# Patient Record
Sex: FEMALE | Race: WHITE | NOT HISPANIC OR LATINO | Employment: UNEMPLOYED | URBAN - METROPOLITAN AREA
[De-identification: names, ages, dates, MRNs, and addresses within clinical notes are randomized per-mention and may not be internally consistent; named-entity substitution may affect disease eponyms.]

---

## 2017-01-30 ENCOUNTER — TELEPHONE (OUTPATIENT)
Dept: PAIN MEDICINE | Facility: CLINIC | Age: 52
End: 2017-01-30

## 2017-01-30 RX ORDER — VITAMIN E 268 MG
400 CAPSULE ORAL DAILY
COMMUNITY

## 2017-01-30 RX ORDER — ESTRADIOL 1 MG/1
1 TABLET ORAL NIGHTLY
COMMUNITY

## 2017-01-30 RX ORDER — MONTELUKAST SODIUM 10 MG/1
10 TABLET ORAL DAILY
COMMUNITY

## 2017-01-30 RX ORDER — LEVOCETIRIZINE DIHYDROCHLORIDE 5 MG/1
5 TABLET, FILM COATED ORAL EVERY EVENING
COMMUNITY

## 2017-01-30 RX ORDER — ALPRAZOLAM 0.25 MG/1
0.5 TABLET ORAL 2 TIMES DAILY PRN
COMMUNITY
End: 2017-03-02 | Stop reason: SDUPTHER

## 2017-03-02 ENCOUNTER — OFFICE VISIT (OUTPATIENT)
Dept: PAIN MEDICINE | Facility: CLINIC | Age: 52
End: 2017-03-02

## 2017-03-02 VITALS
WEIGHT: 234.6 LBS | HEART RATE: 78 BPM | DIASTOLIC BLOOD PRESSURE: 86 MMHG | HEIGHT: 68 IN | RESPIRATION RATE: 18 BRPM | BODY MASS INDEX: 35.55 KG/M2 | OXYGEN SATURATION: 97 % | SYSTOLIC BLOOD PRESSURE: 140 MMHG | TEMPERATURE: 97.2 F

## 2017-03-02 DIAGNOSIS — H81.11 BPV (BENIGN POSITIONAL VERTIGO), RIGHT: ICD-10-CM

## 2017-03-02 DIAGNOSIS — G47.30 OBESITY WITH SLEEP APNEA: ICD-10-CM

## 2017-03-02 DIAGNOSIS — M48.061 LUMBAR FORAMINAL STENOSIS: ICD-10-CM

## 2017-03-02 DIAGNOSIS — E66.8 MODERATE OBESITY: Primary | ICD-10-CM

## 2017-03-02 DIAGNOSIS — M51.17 INTERVERTEBRAL DISC DISORDER WITH RADICULOPATHY OF LUMBOSACRAL REGION: ICD-10-CM

## 2017-03-02 DIAGNOSIS — R53.81 PHYSICAL DECONDITIONING: ICD-10-CM

## 2017-03-02 DIAGNOSIS — E66.9 OBESITY WITH SLEEP APNEA: ICD-10-CM

## 2017-03-02 DIAGNOSIS — E66.8 MODERATE OBESITY: ICD-10-CM

## 2017-03-02 DIAGNOSIS — F41.1 GENERALIZED ANXIETY DISORDER: ICD-10-CM

## 2017-03-02 PROBLEM — H81.10 BPV (BENIGN POSITIONAL VERTIGO): Status: ACTIVE | Noted: 2017-03-02

## 2017-03-02 PROBLEM — M51.27 DISPLACEMENT OF INTERVERTEBRAL DISC OF LUMBOSACRAL REGION: Status: ACTIVE | Noted: 2017-03-02

## 2017-03-02 PROBLEM — M47.816 SPONDYLOSIS OF LUMBAR REGION WITHOUT MYELOPATHY OR RADICULOPATHY: Status: ACTIVE | Noted: 2017-03-02

## 2017-03-02 PROBLEM — M81.0 OSTEOPOROSIS: Status: ACTIVE | Noted: 2017-03-02

## 2017-03-02 PROCEDURE — 99204 OFFICE O/P NEW MOD 45 MIN: CPT | Performed by: ANESTHESIOLOGY

## 2017-03-02 RX ORDER — ALPRAZOLAM 0.5 MG/1
TABLET ORAL
COMMUNITY
Start: 2017-02-21

## 2017-03-02 RX ORDER — MECLIZINE HYDROCHLORIDE 25 MG/1
TABLET ORAL
COMMUNITY
Start: 2017-01-26

## 2017-03-02 NOTE — PROGRESS NOTES
"Chief Complaint: \"Pain in my lower back and left leg to my foot.\"     History of Present Illness:   Patient: Ms. Barby Krause, 51 y.o. female   Referring physician: Dr. Josh Armas     Reason for referral: Consultation for intractable chronic lower back and left lower extremity pain.   Pain history: She reports a 10 year history of lower back pain, which began without incident. Pain has progressed in intensity over the past year, particularly since November, she started experiencing severe left lower extremity pain.   Pain description: Constant pain with intermittent exacerbation, described as aching sensation.   Radiation of pain: The lower back pain radiates into the posterior aspect of the left side, the left calf, and into the plantar aspect of her left foot into her toes.    Pain intensity today: 8/10  Average pain intensity last week: 8/10  Pain intensity ranges from: 5/10 to 10/10  Aggravating factors: Pain increases with twisting, bending, sitting longer than 60 minutes, standing for 15 minutes.  Alleviating factors: Pain decreases with analgesics, changing positions, and walking.   Associated symptoms:   Patient reports denies numbness or weakness in the left lower extremity, except for numbness in the toes of her left foot.   Patient reports any new bladder or bowel problems.   Patient reports difficulties with her balance due to benign positional vertigo. Patient denies recent falls.  Patient reports frequent cervicogenic headaches lasting 2 hours.     Review of previous therapies and additional medical records:  Barby Krause has already failed the following measures, including:   Conservative measures: Oral analgesics, physical therapy, ice, heat, chiropractic therapy and TENs.    Interventional measures: None    Surgical measures: No previous lumbar surgery.   Barby Krause did not undergo surgical or neurosurgical  Consultation for this condition. Patient is status post three " cervical spine surgeries, last one 2010 with Dr. Coyle.  Barby Krause presents with significant comorbidities including anxiety, engaged in treatment, obesity, arrhythmia IBS, GERD, engaged in treatment.  In terms of current analgesics, Barby Krause takes: Advil  I have reviewed [facility] Report #[id number] consistent to medication reconciliation.     Review of diagnostic Studies:   MRI, lumbar, without, 11/09/2016: Conus medullaris is unremarkable.  L1-L2, L2/L3: No significant disc abnormalities.  No canal or foraminal stenosis.  L3-L4: Mild annular bulging.  No canal or neuroforaminal stenosis  L4-L5: Advanced disc space narrowing. Mild diffuse annular bulge. Minor facet arthropathy. No central canal narrowing mild neural foraminal narrowing is seen bilaterally.  L5-S1: There is mild to moderate disc space narrowing. A small broad-based disc bulges seen in the left foraminal region. Mild facet arthropathy. Mild to moderate right and moderate left neuroforaminal narrowing.  X-rays of the lumbar spine August 25, 2016.  Revealed degenerative disc disease at L4-L5 and L5-S1.  Lumbar facet arthropathy.    Review of Systems   Constitutional: Positive for fatigue.   HENT: Positive for postnasal drip.    Cardiovascular: Positive for palpitations.   Musculoskeletal: Positive for back pain.   Allergic/Immunologic: Positive for environmental allergies and food allergies.   Neurological: Positive for dizziness, numbness and headaches.   Psychiatric/Behavioral: Positive for sleep disturbance. The patient is nervous/anxious.    All other systems reviewed and are negative.     Patient Active Problem List   Diagnosis   • Lumbar foraminal stenosis   • Moderate obesity   • Obesity with sleep apnea   • Generalized anxiety disorder   • Physical deconditioning   • BPV (benign positional vertigo)   • Intervertebral disc disorder with radiculopathy of lumbosacral region       Past Medical History   Diagnosis Date   • Anxiety     • Chronic pain disorder    • Extremity pain    • GERD (gastroesophageal reflux disease)    • Headache    • IBS (irritable bowel syndrome)    • Low back pain          Past Surgical History   Procedure Laterality Date   • Appendectomy     • Cholecystectomy     • Hysterectomy     • Tonsillectomy     •  section     • Knee arthroscopy w/ meniscal repair     • Neck surgery       3 different surgies on neck   • Foot surgery       3 surgeries on foot   • Shoulder surgery     • Eye surgery           Family History   Problem Relation Age of Onset   • Diabetes Mother    • Heart disease Mother    • Kidney disease Mother    • Heart disease Father          Social History     Social History   • Marital status:      Spouse name: N/A   • Number of children: N/A   • Years of education: N/A     Social History Main Topics   • Smoking status: Former Smoker   • Smokeless tobacco: Never Used   • Alcohol use No   • Drug use: No   • Sexual activity: Not Asked     Other Topics Concern   • None     Social History Narrative        Current Outpatient Prescriptions:   •  ALPRAZolam (XANAX) 0.5 MG tablet, , Disp: , Rfl:   •  Azelastine-Fluticasone (DYMISTA NA), into each nostril As Needed., Disp: , Rfl:   •  Cholecalciferol (VITAMIN D3) 5000 UNITS capsule capsule, Take 5,000 Units by mouth Daily., Disp: , Rfl:   •  estradiol (ESTRACE) 1 MG tablet, Take 1 mg by mouth Every Night., Disp: , Rfl:   •  levocetirizine (XYZAL) 5 MG tablet, Take 5 mg by mouth Every Evening., Disp: , Rfl:   •  meclizine (ANTIVERT) 25 MG tablet, , Disp: , Rfl:   •  montelukast (SINGULAIR) 10 MG tablet, Take 10 mg by mouth Daily., Disp: , Rfl:   •  progesterone (PROMETRIUM) 100 MG capsule, Take 100 mg by mouth Every Night., Disp: , Rfl:   •  vitamin E 400 UNIT capsule, Take 400 Units by mouth Daily., Disp: , Rfl:       Allergies   Allergen Reactions   • Clindamycin/Lincomycin    • Codeine    • Codone [Hydrocodone]    • Latex    • Penicillins    • Sulfa  "Antibiotics       Visit Vitals   • /86 (BP Location: Left arm, Patient Position: Sitting)   • Pulse 78   • Temp 97.2 °F (36.2 °C) (Temporal Artery )   • Resp 18   • Ht 68\" (172.7 cm)   • Wt 234 lb 9.6 oz (106 kg)   • SpO2 97%   • BMI 35.67 kg/m2      Physical Exam:  Constitutional: Patient is oriented to person, place, and time. Vital signs are normal. Patient appears well-developed and well-nourished.   HENT: Head: Normocephalic and atraumatic. Eyes: Conjunctivae and lids are normal. Pupils: Equal, round, reactive to light.   Neck: Trachea normal. Neck supple. No JVD present.   Pulmonary Respiratory effort: No increased work of breathing or signs of respiratory distress. Auscultation of lungs: Clear to auscultation.   Cardiovascular Auscultation of heart: Normal rate and rhythm, normal S1 and S2, no murmurs.   Peripheral vascular exam: Normal. No edema.   Abdomen: The abdomen was soft and nontender. Bowel sounds were normal.   Musculoskeletal   Gait and station: Gait evaluation demonstrated a normal gait.   Lumbar spine: The range of motion of the lumbar spine is almost full. Forward flexion increased and reproduced back pain. Lumbar facet joint loading maneuvers are negative.   Paul and Gaenslen's tests are negative   Piriformis maneuvers are negative   Palpation of the bilateral ischial tuberosities, unrevealing   Palpation of the bilateral greater trochanter, unrevealing   Examination of the Iliotibial band: unrevealing   Hip joints: The range of motion of the hip joints is full and without pain   Neurological: Patient is alert and oriented to person, place, and time. Speech: speech is normal. Cortical function: Normal mental status.   Cranial nerves: Cranial nerves 2-12 intact.   Reflex Scores:  Right patellar: 2+  Left patellar: 2+  Right achilles: 2+  Left achilles: 1+  Motor strength: 5/5, except left plantar flexion 4+/5   Motor Tone: normal tone.   Involuntary movements: none. "   Superficial/Primitive Reflexes: primitive reflexes were absent.   Right Amado: absent  Left Amado: absent  Right ankle clonus: absent  Left ankle clonus: absent   No nuchal rigidity. Negative Kernig and Brudzinski.  Spurling sign is negative. Lhermitte sign is negative. Negative long tract signs. Straight leg raising test is negative. Femoral stretch sign is negative.   Sensation: No sensory loss. Sensory exam: intact to light touch, intact pain and temperature sensation, intact vibration sensation and normal proprioception.   Coordination: Normal finger to nose and heel to shin. Normal balance and negative. Romberg's sign negative.   Skin and subcutaneous tissue: Skin is warm and intact. No rash noted. No cyanosis.   Psychiatric:   Judgment and insight: Normal.   Orientation to person, place and time: Normal.   Recent and remote memory: Intact.   Mood and affect: Normal.     ASSESSMENT:   1. Intervertebral disc disorder with radiculopathy of lumbosacral region    2. Lumbar foraminal stenosis    3. Moderate obesity    4. Obesity with sleep apnea    5. BPV (benign positional vertigo), right    6. Generalized anxiety disorder    7. Physical deconditioning      PLAN/MEDICAL DECISION MAKING: I had a lengthy conversation with Ms. Barby Krause regarding her chronic pain condition and potential therapeutic options including risks, benefits, alternative therapies, to name a few. Patient has failed to obtain pain relief with conservative measures, as referenced above. I have reviewed all available patient's medical records as well as previous therapies as referenced above.  Therefore, I have proposed the following plan:  1.  Diagnostic studies: EMG/NCV of the bilateral lower extremities   2.  Long-term rehabilitation efforts:  A. The patient does not have a history of falls. I did complete a risk assessment for falls. Patient has risks factors.  Fall precautions: Patient has been instructed regarding universal  fall precautions, such as;   · Using gait aids a cane at the appropriate height for ambulation   · Removing all area rugs and coffee tables to create a safe environment at home  · Ensure clean, dry floors  · Wearing supportive footwear and properly fitting clothing  · Ensure bed/chair is appropriate height and patient's feet can touch the floor  · Using a shower transfer bench  · Using walk-in shower and having shower safety bars installed  · Ensure proper lighting, minimize glare  · Have nightlights operational and in use  · Participation in an exercise program for gait training, balance training and strength  · Ensure proper compliance and organization of medications to avoid errors   · Avoid use of over the counter sedatives and alcohol consumption  · Ensure easy access to call bell, glasses, TV control, telephone  · Ensure glasses/hearing aids are in use or close by (on top of night table)  B.  Patient will start a comprehensive physical therapy program for water therapy, core strengthening, gait and balance training, neurodynamics, myofascial release, cupping and dry needling once pain is under control  C.  Start an exercise program such as water therapy  D.  Referral to [facility]  3.   Pharmacological measures: Patient has declined pharmacological trials  4.   Interventional pain management measures: Patient will be scheduled for diagnostic and therapeutic left L5-S1 and left S1 transforaminal epidural steroid injection. We may repeat another epidural depending on patient's outcome.  Otherwise, I will refer the patient for neurosurgical consultation  5.  The patient has been instructed to contact my office with any questions or difficulties. The patient understands the plan and agrees to proceed accordingly.       Patient Care Team:  Matheus Armas MD as PCP - General (Internal Medicine)  No Known Provider as PCP - Family Medicine  Tye Thurston MD as Consulting  Physician (Pain Medicine)     No orders of the defined types were placed in this encounter.        No future appointments.      Tye Thurston MD     EMR Dragon/Transcription disclaimer:  Much of this encounter note is an electronic transcription of spoken language to printed text. Electronic transcription of spoken language may permit erroneous, or at times, nonsensical words or phrases to be inadvertently transcribed. Although I have reviewed the note for such errors, some may still exist.

## 2017-03-03 ENCOUNTER — PROCEDURE VISIT (OUTPATIENT)
Dept: NEUROLOGY | Facility: CLINIC | Age: 52
End: 2017-03-03

## 2017-03-03 DIAGNOSIS — R20.0 NUMBNESS AND TINGLING: Primary | ICD-10-CM

## 2017-03-03 DIAGNOSIS — R20.2 NUMBNESS AND TINGLING: Primary | ICD-10-CM

## 2017-03-03 PROCEDURE — 95911 NRV CNDJ TEST 9-10 STUDIES: CPT | Performed by: PSYCHIATRY & NEUROLOGY

## 2017-03-03 PROCEDURE — 95886 MUSC TEST DONE W/N TEST COMP: CPT | Performed by: PSYCHIATRY & NEUROLOGY

## 2017-03-03 NOTE — PROGRESS NOTES
"Procedure   EMG & Nerve Conduction Test  Date/Time: 3/3/2017 8:41 AM  Performed by: ASHLY MAYNARD  Authorized by: ASHLY MAYNARD   Consent: Verbal consent obtained.            [facility]   [address], [address]  [place]  [address]   Phone: [phone]  FAX: [phone]           Patient: amos williamson YOB: 1965  Gender: Female  Reason for study: see below in history section      Visit Date: 3/3/2017 09:08  Age: 51 Years 7 Months Old  Examining Physician: [place]       The patient has a chief complaint and history of bilateral, lower extremity, pain,    The patient is referred to have this problem evaluated today with EMG and nerve conduction studies.  The performing physician also acted as a technician on this study.  Please note that in the table \"NR\" stands for \"no response\" therefore testing was performed, but no response was elicited.    A brief neurological exam, revealed no abnormalities in muscle bulk strength reflexes and sensation.      Sensory NCS      Nerve / Sites Rec. Site Distance Onset Lat NP Amp Onset Phillip     cm ms µV m/s   L Sural - Ankle (Calf)      Calf Ankle 14 3.5 19.9 40   R Sural - Ankle (Calf)      Calf Ankle 14 3.1 4.6 46   L Superficial peroneal - Ankle      Lat leg Ankle 8 1.9 5.4 43       Motor NCS      Nerve / Sites Muscle Distance Latency Amplitude Velocity     cm ms mV m/s   L Peroneal - EDB      Ankle EDB 8 5.00 3.4       Fib head EDB 30 12.19 3.6 42   R Peroneal - EDB      Ankle EDB 8 4.43 6.4       Fib head EDB 30 10.94 6.3 46   L Tibial - AH      Ankle AH 8 4.17 8.8       Pop fossa AH 43 13.65 8.6 45   R Tibial - AH      Ankle AH 8 4.64 6.4       Pop fossa AH 43 15.63 6.4 39       F  Wave      Nerve F-min    ms   L Tibial - AH 53   L Peroneal - EDB 45   R Peroneal - EDB 43   R Tibial - AH 53       H Reflex      Nerve H Lat    ms   L Tibial - Soleus 30.5   R Tibial - Soleus 33.9       EMG Summary Table     Spontaneous MUAP " Recruitment    IA Fib PSW Fasc H.F. Amp Dur. PPP Pattern   L. GASTROCN (MED) N None None None None N N N N   L. TIB ANTERIOR N None None None None N N N N   L. ILIOPSOAS N None None None None N N N N   L. QUADRICEPS N None None None None N N N N   L. GLUTEUS MAX N None None None None N N N N   L. L.PARASPINALS N None None None None N N N N   R. L.PARASPINALS N None None None None N N N N   R. GASTROCN (MED) N None None None None N N N N   R. TIB ANTERIOR N None None None None N N N N   R. ILIOPSOAS N None None None None N N N N   R. QUADRICEPS N None None None None N N N N   R. GLUTEUS MAX N None None None None N N N N     1.) SNAPs and NCVs were normal  2.) CMAPs and NCVs were normal  3.) F-waves were normal in persistence and minimal latency.  4.) H-reflexes were normal  5.) EMGs were normal in all muscles tested.    There is currently no electrodiagnostic evidence of a neuromuscular disease.      All NCS were performed at 32C or greater.    The referring health care provider will discuss this report and possible further diagnostic and management options with the patient.      Babatunde Justin M.D.                       Diagnoses and all orders for this visit:    Numbness and tingling  -     EMG & Nerve Conduction Test

## 2017-03-27 ENCOUNTER — OUTSIDE FACILITY SERVICE (OUTPATIENT)
Dept: PAIN MEDICINE | Facility: CLINIC | Age: 52
End: 2017-03-27

## 2017-03-27 PROCEDURE — 99152 MOD SED SAME PHYS/QHP 5/>YRS: CPT | Performed by: ANESTHESIOLOGY

## 2017-03-27 PROCEDURE — 64484 NJX AA&/STRD TFRM EPI L/S EA: CPT | Performed by: ANESTHESIOLOGY

## 2017-03-27 PROCEDURE — 64483 NJX AA&/STRD TFRM EPI L/S 1: CPT | Performed by: ANESTHESIOLOGY

## 2017-04-05 ENCOUNTER — OFFICE VISIT (OUTPATIENT)
Dept: NUTRITION | Facility: HOSPITAL | Age: 52
End: 2017-04-05
Attending: ANESTHESIOLOGY

## 2017-04-05 VITALS — WEIGHT: 233 LBS | HEIGHT: 68 IN | BODY MASS INDEX: 35.31 KG/M2

## 2017-04-05 PROCEDURE — 97802 MEDICAL NUTRITION INDIV IN: CPT | Performed by: DIETITIAN, REGISTERED

## 2017-04-05 NOTE — CONSULTS
"Adult Outpatient Nutrition  Assessment/PES    Patient Name:  Barby Krause  YOB: 1965  MRN: [id number]    Assessment Date:  4/5/2017          General Info       04/05/17 1359    Today's Session    Person(s) attending today's session Patient     Services Used Today? No    General Information    How well do you speak English? very well    Do you speak a language other than English at home? no    Are you able to read and write English? Yes    Lives With spouse    Last grade of school completed 12th grade    Is patient pregnant? no            Physical Findings       04/05/17 1359    Physical Findings/Assessment    Additional Documentation Physical Appearance (Group)    Physical Appearance    Overall Physical Appearance obese              Nutritional Info/Activity       04/05/17 1400    Nutritional Information    Have you had weight changes? Yes    Describe weight changes Weight gain last July due to taking steroids    What is your desired body weight? 72.6 kg (160 lb)    Have you tried to lose weight before? No    What is your motivation to lose weight? \"to feel better get healthy\"    Use of Supplements vitamins   vitamin D and vitamin E    History of eating disorder? No    What cultural diet influences are important for you to follow? none noted    Do you have difficulty chewing food? No    Who Prepares Meals self    List any food cravings/trigger foods you have Chocolate    List any food aversions none noted    How often during the day do you find yourself snacking? 1-2x/day    Food Behaviors Stress eater    How often do you eat out and where? \"once in a [place]\"    Do you use Food Assistance programs (WIC, food stamps, food bank)? no    Do you need information about Food Assistance programs? no    How many times do you drink milk per day? 1    How many times do you eat fruit per day? 1    How many times do you eat vegetables per day? 1    How many times do you drink juice per day? 0 " "   How many times do you eat candy/chocolates per day? 1    How many times do you eat baked goods per day? 1    How many times do you eat desserts per day? 1    How many times do you eat ice cream per day? 1    How many times do you eat snack foods per day? 1    How many diet sodas do you drink per day? 0    How many regular sodas do you drink per day? 0    How many times do you eat ethnic food per day? 0    How many times do you drink alcohol per day? 0    How many times do you have caffeine per day? 0    How many servings of artificial sweetner do you have per day? 0    How many meals do you eat each day? 0    How many snacks do you eat each day? 1    What is the biggest challenge you have with your diet? Food preperation;Poor choices;Knowledge    Enter everything you can remember eating in the last 24 hours (1 day) AM snack: 7-8 beet chips. Dinner: 1.5 cups chicken israel, 1 cup corn. HS Snack: 1 whole bagel with blue berry cream cheese.     Eating Environment    Eating environment Family    Physical Activity    Are you currently involved in an activity/exercise program?  No              Estimated/Assessed Needs       04/05/17 1403    Calculation Measurements    Weight Used For Calculations 106 kg (233 lb)    Height Used for Calculations 1.727 m (5' 8\")    Estimated/Assessed Energy Needs    Energy Need Method [facility] [place]    Age 51    RMR ([facility] Equation) 1710.38    Activity Factors ([facility])  Confined to bed  1.2    Total estimated needs ([facility]) 2052 kcal/day    Estimated Kcal Range  1500 kcal/day for gradual weight loss                Problem/Interventions:        Problem 1       04/05/17 1405    Nutrition Diagnoses Problem 1    Problem 1 Overweight/Obesity    Etiology (related to) Factors Affecting Nutrition    Reported/Observed By Patient    Food Habit/Preferences Large Meals    Signs/Symptoms (evidenced by) BMI                    Intervention Goal       04/05/17 1405    " Intervention Goal    General Provide information regarding MNT for treatment/condition;Meet nutritional needs for age/condition    PO Meet estimated needs    Weight Appropriate weight loss              Comments:  Patient describes problems with weight gain since she started taking steroids last July. Patient reports diet challenges of sweet cravings, cooking for  who is a picky eater (he does not eat fruits or vegetables), and leg/back pain (interfere with daily activities and exercise). Patient reports only eating 1-2 meals a day, usually at night. Eating breakfast makes patient nauseous. Patient also complains of heartburn at night, possibly due to large meals/snacks at night. Patient is not involved in any physical activity due to pain, she just started physical therapy twice a week.     Instructional process includes the plate method, nutrition label, meal planning, portions, restaurant nutrition, food record keeping, and exercise. Provided patient with meal ideas using the plate method which she can cook for herself and her . Encouraged patient to discuss what exercises she can do at home with her physical therapist.     Materials provided include [facility] Weight Loss Toolkit, 1500 kcal meal plan and sample menu, and supporting nutrition education materials.     Goals:  Specify Goal 1 Lose weight 0.5-1 lb/week   Specify Goal 2 Eat 3 meals/day   Specify Goal 3 Follow plate method     Patient was provided with RD's contact information. Follow up visit is scheduled for 4/27/17 at 2:00 p.m. Thank you for this referral.      Electronically signed by:  Maria E Ruiz RD  04/05/17 2:06 PM

## 2017-04-26 ENCOUNTER — TELEPHONE (OUTPATIENT)
Dept: NUTRITION | Facility: HOSPITAL | Age: 52
End: 2017-04-26

## 2017-04-26 NOTE — PROGRESS NOTES
Adult Outpatient Nutrition  Assessment    Patient Name:  Barby Krause  YOB: 1965  MRN: [id number]        Assessment Date:  4/26/2017          Comments:  Spoke with patient on the phone for follow up. Patient started physical therapy yesterday, and her hip and back are still in pain. Patient reported it is hard for her to eat 6 small meals a day. Patient eats one meal a day at night, still has heartburn. Patient unsure of her current weight, thinks her clothes still fit the same. Trying to follow the plate method.     Discussed with patient the importance of keeping a regular schedule for weight loss and reduce heart burn. Patient rescheduled follow up visit for 5/1/17 at 2:45 p.m.         Electronically signed by:  Maria E Ruiz RD  04/26/17 3:57 PM

## 2017-04-27 ENCOUNTER — APPOINTMENT (OUTPATIENT)
Dept: NUTRITION | Facility: HOSPITAL | Age: 52
End: 2017-04-27
Attending: ANESTHESIOLOGY

## 2017-05-01 ENCOUNTER — APPOINTMENT (OUTPATIENT)
Dept: NUTRITION | Facility: HOSPITAL | Age: 52
End: 2017-05-01
Attending: ANESTHESIOLOGY

## 2017-05-02 ENCOUNTER — TELEPHONE (OUTPATIENT)
Dept: NUTRITION | Facility: HOSPITAL | Age: 52
End: 2017-05-02

## 2025-06-25 ENCOUNTER — LAB REQUISITION (OUTPATIENT)
Dept: LAB | Facility: HOSPITAL | Age: 60
End: 2025-06-25
Payer: COMMERCIAL

## 2025-06-25 DIAGNOSIS — H18.511 ENDOTHELIAL CORNEAL DYSTROPHY, RIGHT EYE: ICD-10-CM

## 2025-06-25 PROCEDURE — 87102 FUNGUS ISOLATION CULTURE: CPT | Performed by: OPHTHALMOLOGY

## 2025-06-25 PROCEDURE — 87206 SMEAR FLUORESCENT/ACID STAI: CPT | Performed by: OPHTHALMOLOGY

## 2025-06-26 LAB — GIE STN SPEC: NORMAL

## 2025-07-02 LAB — FUNGUS WND CULT: NORMAL

## 2025-07-09 LAB — FUNGUS WND CULT: NORMAL

## 2025-07-16 LAB — FUNGUS WND CULT: NORMAL

## 2025-07-23 LAB — FUNGUS WND CULT: NORMAL

## 2025-07-30 LAB — FUNGUS WND CULT: NORMAL

## 2025-08-06 LAB — FUNGUS WND CULT: NORMAL
